# Patient Record
Sex: FEMALE | Race: BLACK OR AFRICAN AMERICAN | NOT HISPANIC OR LATINO | URBAN - METROPOLITAN AREA
[De-identification: names, ages, dates, MRNs, and addresses within clinical notes are randomized per-mention and may not be internally consistent; named-entity substitution may affect disease eponyms.]

---

## 2023-09-21 ENCOUNTER — EMERGENCY (EMERGENCY)
Facility: HOSPITAL | Age: 53
LOS: 1 days | Discharge: HOME | End: 2023-09-21
Attending: EMERGENCY MEDICINE | Admitting: EMERGENCY MEDICINE
Payer: COMMERCIAL

## 2023-09-21 VITALS
DIASTOLIC BLOOD PRESSURE: 82 MMHG | TEMPERATURE: 99 F | HEART RATE: 98 BPM | SYSTOLIC BLOOD PRESSURE: 147 MMHG | OXYGEN SATURATION: 100 % | RESPIRATION RATE: 18 BRPM

## 2023-09-21 LAB
BASOPHILS # BLD AUTO: 0.03 K/UL — SIGNIFICANT CHANGE UP (ref 0–0.2)
BASOPHILS NFR BLD AUTO: 0.4 % — SIGNIFICANT CHANGE UP (ref 0–2)
EOSINOPHIL # BLD AUTO: 0.1 K/UL — SIGNIFICANT CHANGE UP (ref 0–0.5)
EOSINOPHIL NFR BLD AUTO: 1.3 % — SIGNIFICANT CHANGE UP (ref 0–6)
HCT VFR BLD CALC: 41.9 % — SIGNIFICANT CHANGE UP (ref 34.5–45)
HGB BLD-MCNC: 13.3 G/DL — SIGNIFICANT CHANGE UP (ref 11.5–15.5)
IANC: 3.72 K/UL — SIGNIFICANT CHANGE UP (ref 1.8–7.4)
IMM GRANULOCYTES NFR BLD AUTO: 0.3 % — SIGNIFICANT CHANGE UP (ref 0–0.9)
LYMPHOCYTES # BLD AUTO: 3.06 K/UL — SIGNIFICANT CHANGE UP (ref 1–3.3)
LYMPHOCYTES # BLD AUTO: 40.2 % — SIGNIFICANT CHANGE UP (ref 13–44)
MCHC RBC-ENTMCNC: 24.5 PG — LOW (ref 27–34)
MCHC RBC-ENTMCNC: 31.7 GM/DL — LOW (ref 32–36)
MCV RBC AUTO: 77.2 FL — LOW (ref 80–100)
MONOCYTES # BLD AUTO: 0.69 K/UL — SIGNIFICANT CHANGE UP (ref 0–0.9)
MONOCYTES NFR BLD AUTO: 9.1 % — SIGNIFICANT CHANGE UP (ref 2–14)
NEUTROPHILS # BLD AUTO: 3.72 K/UL — SIGNIFICANT CHANGE UP (ref 1.8–7.4)
NEUTROPHILS NFR BLD AUTO: 48.7 % — SIGNIFICANT CHANGE UP (ref 43–77)
NRBC # BLD: 0 /100 WBCS — SIGNIFICANT CHANGE UP (ref 0–0)
NRBC # FLD: 0 K/UL — SIGNIFICANT CHANGE UP (ref 0–0)
PLATELET # BLD AUTO: 326 K/UL — SIGNIFICANT CHANGE UP (ref 150–400)
RBC # BLD: 5.43 M/UL — HIGH (ref 3.8–5.2)
RBC # FLD: 16.4 % — HIGH (ref 10.3–14.5)
WBC # BLD: 7.62 K/UL — SIGNIFICANT CHANGE UP (ref 3.8–10.5)
WBC # FLD AUTO: 7.62 K/UL — SIGNIFICANT CHANGE UP (ref 3.8–10.5)

## 2023-09-21 PROCEDURE — 93010 ELECTROCARDIOGRAM REPORT: CPT

## 2023-09-21 PROCEDURE — 71046 X-RAY EXAM CHEST 2 VIEWS: CPT | Mod: 26

## 2023-09-21 PROCEDURE — 99285 EMERGENCY DEPT VISIT HI MDM: CPT

## 2023-09-21 NOTE — ED ADULT NURSE NOTE - OBJECTIVE STATEMENT
Pt is A&O x 4, ambulatory w/o assistance, shows no signs of acute distress. Presents to the ED with intermittent midsternal chest pain that radiates to her left arm and scapula x 2 days. States she visited urgent care first and was referred to the ED. Pt describes the discomfort as a pinching pain. Endorses feeling the discomfort before but never for an extended period of time. Denies n/v/d, dizziness/blurry vision, fevers/chills, or SOB. VSS upon arrival. Respirations even and unlabored. Right AC 20 gauge placed and labs drawn. Pending lab results and CXR.

## 2023-09-21 NOTE — ED PROVIDER NOTE - OBJECTIVE STATEMENT
54 yo F with HTN, pre-DM, pituitary adenoma here with midsternal c/p with radiation to the left shoulder and arm since yesterday afternoon while she was at work.  sent in form urgent care for further work up, phx of prediabetes, HTN, pituitary tumor not on chemo or radiation.  chest pain 54 yo F with HTN, pre-DM, pituitary adenoma here with midsternal c/p with radiation to the left shoulder and arm since yesterday afternoon while she was at work. The pt took 324mg aspirin yesterday and 81 mg aspirin today. The pt was sent in form urgent care for further work up. No prior cardiac work-up.

## 2023-09-21 NOTE — ED ADULT TRIAGE NOTE - CHIEF COMPLAINT QUOTE
c/o midsternal c/p since last night  sent in form urgent care for further work up, phx of prediabetes, HTN, pituitary tumor not on chemo or radiation.

## 2023-09-21 NOTE — ED PROVIDER NOTE - CLINICAL SUMMARY MEDICAL DECISION MAKING FREE TEXT BOX
Will r/o ACS with ecg, chest xray, cardiac enzymes and will send to CDU for echo, stress and tele monitoring. Luis att: Will r/o ACS with ecg, chest xray, cardiac enzymes and will send to CDU for echo, stress and tele monitoring.

## 2023-09-22 VITALS — RESPIRATION RATE: 16 BRPM | TEMPERATURE: 98 F | OXYGEN SATURATION: 98 %

## 2023-09-22 LAB
ALBUMIN SERPL ELPH-MCNC: 4.4 G/DL — SIGNIFICANT CHANGE UP (ref 3.3–5)
ALP SERPL-CCNC: 95 U/L — SIGNIFICANT CHANGE UP (ref 40–120)
ALT FLD-CCNC: 43 U/L — HIGH (ref 4–33)
ANION GAP SERPL CALC-SCNC: 9 MMOL/L — SIGNIFICANT CHANGE UP (ref 7–14)
AST SERPL-CCNC: 31 U/L — SIGNIFICANT CHANGE UP (ref 4–32)
BILIRUB SERPL-MCNC: <0.2 MG/DL — SIGNIFICANT CHANGE UP (ref 0.2–1.2)
BUN SERPL-MCNC: 23 MG/DL — SIGNIFICANT CHANGE UP (ref 7–23)
CALCIUM SERPL-MCNC: 9.7 MG/DL — SIGNIFICANT CHANGE UP (ref 8.4–10.5)
CHLORIDE SERPL-SCNC: 100 MMOL/L — SIGNIFICANT CHANGE UP (ref 98–107)
CO2 SERPL-SCNC: 32 MMOL/L — HIGH (ref 22–31)
CREAT SERPL-MCNC: 1.04 MG/DL — SIGNIFICANT CHANGE UP (ref 0.5–1.3)
EGFR: 64 ML/MIN/1.73M2 — SIGNIFICANT CHANGE UP
GLUCOSE SERPL-MCNC: 111 MG/DL — HIGH (ref 70–99)
NT-PROBNP SERPL-SCNC: <36 PG/ML — SIGNIFICANT CHANGE UP
POTASSIUM SERPL-MCNC: 3.4 MMOL/L — LOW (ref 3.5–5.3)
POTASSIUM SERPL-SCNC: 3.4 MMOL/L — LOW (ref 3.5–5.3)
PROT SERPL-MCNC: 8.2 G/DL — SIGNIFICANT CHANGE UP (ref 6–8.3)
SODIUM SERPL-SCNC: 141 MMOL/L — SIGNIFICANT CHANGE UP (ref 135–145)
TROPONIN T, HIGH SENSITIVITY RESULT: <6 NG/L — SIGNIFICANT CHANGE UP
TROPONIN T, HIGH SENSITIVITY RESULT: <6 NG/L — SIGNIFICANT CHANGE UP

## 2023-09-22 PROCEDURE — 78451 HT MUSCLE IMAGE SPECT SING: CPT | Mod: 26,MF

## 2023-09-22 PROCEDURE — 93306 TTE W/DOPPLER COMPLETE: CPT | Mod: 26

## 2023-09-22 PROCEDURE — 93018 CV STRESS TEST I&R ONLY: CPT | Mod: GC,MF

## 2023-09-22 PROCEDURE — G1004: CPT

## 2023-09-22 PROCEDURE — 93016 CV STRESS TEST SUPVJ ONLY: CPT | Mod: GC,MF

## 2023-09-22 PROCEDURE — 99236 HOSP IP/OBS SAME DATE HI 85: CPT

## 2023-09-22 RX ORDER — AMLODIPINE BESYLATE 2.5 MG/1
10 TABLET ORAL ONCE
Refills: 0 | Status: COMPLETED | OUTPATIENT
Start: 2023-09-22 | End: 2023-09-22

## 2023-09-22 RX ORDER — POTASSIUM CHLORIDE 20 MEQ
40 PACKET (EA) ORAL ONCE
Refills: 0 | Status: COMPLETED | OUTPATIENT
Start: 2023-09-22 | End: 2023-09-22

## 2023-09-22 RX ORDER — ASPIRIN/CALCIUM CARB/MAGNESIUM 324 MG
162 TABLET ORAL ONCE
Refills: 0 | Status: COMPLETED | OUTPATIENT
Start: 2023-09-22 | End: 2023-09-22

## 2023-09-22 RX ORDER — CALCIUM ACETATE 667 MG
667 TABLET ORAL ONCE
Refills: 0 | Status: COMPLETED | OUTPATIENT
Start: 2023-09-22 | End: 2023-09-22

## 2023-09-22 RX ADMIN — Medication 1 TABLET(S): at 13:05

## 2023-09-22 RX ADMIN — Medication 667 MILLIGRAM(S): at 13:06

## 2023-09-22 RX ADMIN — Medication 40 MILLIEQUIVALENT(S): at 02:09

## 2023-09-22 RX ADMIN — Medication 162 MILLIGRAM(S): at 01:50

## 2023-09-22 RX ADMIN — AMLODIPINE BESYLATE 10 MILLIGRAM(S): 2.5 TABLET ORAL at 13:05

## 2023-09-22 NOTE — CONSULT NOTE ADULT - SUBJECTIVE AND OBJECTIVE BOX
Cardiovascular Disease Initial Evaluation  Date of Service: 09-22-23 @ 10:07    CHIEF COMPLAINT: Chest pain    HISTORY OF PRESENT ILLNESS:    This  is a 53 year old woman with HTN, prediabetes, and pituitary adenoma who presented to [facility] on 9/21/2023 with midsternal chest pain radiating to LUE since 9/20/23 afternoon.  In the ED, VSS, pt afebrile.  EKG NSR with no acute/ischemic morphology.      Patient currently denies pain.    Allergies  Bactrim DS (Hives)      	    MEDICATIONS:                  PAST MEDICAL & SURGICAL HISTORY:  HTN (hypertension)      Prediabetes      Pituitary adenoma      No significant past surgical history          FAMILY HISTORY:  No pertinent family history in first degree relatives        SOCIAL HISTORY:    The patient is a nonsmoker       REVIEW OF SYSTEMS:  See HPI, otherwise complete 14 point review of systems negative        PHYSICAL EXAM:  T(C): 36.8 (09-22-23 @ 05:56), Max: 37.1 (09-22-23 @ 02:29)  HR: 74 (09-22-23 @ 05:56) (70 - 98)  BP: 118/70 (09-22-23 @ 05:56) (118/70 - 147/82)  RR: 18 (09-22-23 @ 05:56) (18 - 18)  SpO2: 100% (09-22-23 @ 05:56) (99% - 100%)  Wt(kg): --  I&O's Summary      Appearance: No Acute Distress; resting comfortably  HEENT:  Normal oral mucosa, PERRL, EOMI	  Cardiovascular: Normal S1 S2, No JVD, No murmurs/rubs/gallops  Respiratory: Normal respiratory effort; Lungs clear to auscultation bilaterally  Gastrointestinal:  Soft, Non-tender, + BS	  Skin: No rashes, No ecchymoses, No cyanosis	  Neurologic: Non-focal; no weakness  Extremities: No clubbing, cyanosis or edema  Vascular: Peripheral pulses palpable 2+ bilaterally  Psychiatry: A & O x 3, Mood & affect appropriate    Laboratory Data:	 	    CBC Full  -  ( 21 Sep 2023 23:00 )  WBC Count : 7.62 K/uL  Hemoglobin : 13.3 g/dL  Hematocrit : 41.9 %  Platelet Count - Automated : 326 K/uL  Mean Cell Volume : 77.2 fL  Mean Cell Hemoglobin : 24.5 pg  Mean Cell Hemoglobin Concentration : 31.7 gm/dL  Auto Neutrophil # : 3.72 K/uL  Auto Lymphocyte # : 3.06 K/uL  Auto Monocyte # : 0.69 K/uL  Auto Eosinophil # : 0.10 K/uL  Auto Basophil # : 0.03 K/uL  Auto Neutrophil % : 48.7 %  Auto Lymphocyte % : 40.2 %  Auto Monocyte % : 9.1 %  Auto Eosinophil % : 1.3 %  Auto Basophil % : 0.4 %    09-21    141  |  100  |  23  ----------------------------<  111<H>  3.4<L>   |  32<H>  |  1.04    Ca    9.7      21 Sep 2023 23:00    TPro  8.2  /  Alb  4.4  /  TBili  <0.2  /  DBili  x   /  AST  31  /  ALT  43<H>  /  AlkPhos  95  09-21      Interpretation of Telemetry: Sinus	    ECG:  	Normal sinus rhythm      Assessment:  53 year old woman with HTN, prediabetes, and pituitary adenoma presents with chest pain.     Plan of Care:    #Chest pain-  Ms. Benavides has ruled out for ACS and EKG is nonischemic.  No significant structural heart disease noted on echo.  Awaiting NST, as ordered by CDU.     #HTN-  BP is controlled.    #ACP (advance care planning)-  Advanced care planning was discussed with the patient in the stress lab.    EKG and lab findings were discussed in detail and all questions were answered.      No cardiac objection to discharge if NST is negative for inducible ischemia.       52 minutes spent on total encounter; more than 50% of the visit was spent counseling and/or coordinating care by the attending physician.   	  Hira Jefferson MD [facility]  Cardiovascular Diseases  [phone]

## 2023-09-22 NOTE — ED CDU PROVIDER INITIAL DAY NOTE - OBJECTIVE STATEMENT
54 yo F with HTN, pre-DM, pituitary adenoma here with midsternal c/p with radiation to the left shoulder and arm since yesterday afternoon while she was at work. The pt took 324mg aspirin yesterday and 81 mg aspirin today. The pt was sent in form urgent care for further work up. No prior cardiac work-up.    CDU DENIS Horner Note----  ED Provider HPI as above, reviewed.  Pt is a 54 yo female, PMH HTN, prediabetes, pituitary adenoma, presented to the ED c/o midsternal chest pain radiating to LUE since 9/20/23 afternoon.  In the ED, VSS, pt afebrile.  EKG NSR with no acute/ischemic morphology.  WBC 7.62, Hb 13.3; CMP: potassium 3.4, glucose 111, ALT 43; otherwise CMP unremarkable.  Troponin <6 ---> <6; proBNP <36.  A1c 6.2, HCG 1.9.  CXR was performed; per prelim radiology report: "...IMPRESSION: Clear lungs without cardiomegaly."; official radiology report is pending.  Pt was sent to CDU for continued care plan:  Tele monitoring, stress test, echo, Tele Doc of Day evaluation, general observation care / monitoring.

## 2023-09-22 NOTE — ED ADULT NURSE REASSESSMENT NOTE - NS ED NURSE REASSESS COMMENT FT1
Received pt in results waiting, Pt is lying down in bed comfortably pt breathing is equal and nonlabored. pt denies any pain or discomfort. Pt waiting for lab and xray results. pt safety maintained.

## 2023-09-22 NOTE — ED CDU PROVIDER DISPOSITION NOTE - CLINICAL COURSE
54 yo female, PMH HTN, prediabetes, pituitary adenoma, presented to the ED c/o midsternal chest pain radiating to LUE since 9/20/23 afternoon.  In the ED, VSS, pt afebrile.  EKG NSR with no acute/ischemic morphology.  WBC 7.62, Hb 13.3; CMP: potassium 3.4, glucose 111, ALT 43; otherwise CMP unremarkable.  Troponin <6 ---> <6; proBNP <36.  A1c 6.2, HCG 1.9.  CXR was performed; per prelim radiology report: "...IMPRESSION: Clear lungs without cardiomegaly."; official radiology report is pending.  Pt was sent to CDU for continued care plan:  Tele monitoring, stress test, echo, Tele Doc of Day evaluation, general observation care / monitoring. while in CDU, pt evaluated by teledoc, ECHO/NST unremarkable. pt updated on all results/findings, and will f/u outpatient. Return precautions discussed. pt verbalized understanding and is in agreement with plan.

## 2023-09-22 NOTE — ED CDU PROVIDER DISPOSITION NOTE - NSFOLLOWUPINSTRUCTIONS_ED_ALL_ED_FT
You were seen in our Emergency Department for chest pain.  Continue your home medications.  May take tylenol up to 1000mg every 4-6 hours or ibuprofen up to 400mg with food every 6-8 hours as needed for pain.   Follow up with your PMD in 48-72 hours.   Follow up with your Cardiologist in 48-72 hours (see referral list provided) for further outpatient cardiac workup.  Bring copies of all paperwork/results to your doctor.  If you develop worsening pain, shortness of breath, dizziness, palpitations, rectal bleeding, numbness, tingling, weakness or any worsening symptoms return to our ED for evaluation.

## 2023-09-22 NOTE — ED CDU PROVIDER DISPOSITION NOTE - PATIENT PORTAL LINK FT
You can access the FollowMyHealth Patient Portal offered by [facility] by registering at the following website: [email or web address]. By joining [facility]’s FollowMyHealth portal, you will also be able to view your health information using other applications (apps) compatible with our system.

## 2023-09-22 NOTE — ED CDU PROVIDER DISPOSITION NOTE - ATTENDING APP SHARED VISIT CONTRIBUTION OF CARE
52 yo F with HTN, pre-DM, pituitary adenoma here with midsternal c/p with radiation to the left shoulder and arm. Pt admitted to the observation unit for echo and stress.  Echo and stress test are unremarkable.  Patient has cardiac follow-up.  Patient is feeling better at this time.  Patient given strict return precautions has no questions at this time and will be discharged home.    General: Well appearing, well nourished, in no distress  Head: Normocephalic, atraumatic  Eyes: Conjunctiva clear  Mouth: Mucous membranes moist  Neck: Supple  Heart: Regular rate and rhythm, no murmur or gallop  Lungs: Clear to auscultation  Abdomen: soft, no tenderness  Extremities: No amputations or deformities, cyanosis, edema  Musculoskeletal: DALY, pulses intact  Neurologic: No gross deficits   Psychiatric: Oriented X3, normal mood and affect  Skin: Warm,dry

## 2024-10-11 NOTE — ED CDU PROVIDER DISPOSITION NOTE - DISPOSITION TYPE
Pharmacy requested refills that are already active on file. Refused request to pharmacy.  
DISCHARGE